# Patient Record
Sex: MALE | Race: OTHER | URBAN - METROPOLITAN AREA
[De-identification: names, ages, dates, MRNs, and addresses within clinical notes are randomized per-mention and may not be internally consistent; named-entity substitution may affect disease eponyms.]

---

## 2022-07-07 NOTE — PATIENT DISCUSSION
NOT VISUALLY SIGNIFICANT: Informed patient that their cataract is not visually significant or does not meet the criteria for cataract surgery. Recommend attention to cataract symptoms monitoring by regular examinations. Patient instructed to call with changes in vision.  Mrx given.

## 2023-04-18 ENCOUNTER — NEW PATIENT (OUTPATIENT)
Dept: URBAN - METROPOLITAN AREA CLINIC 52 | Facility: CLINIC | Age: 53
End: 2023-04-18

## 2023-04-18 DIAGNOSIS — Z01.01: ICD-10-CM

## 2023-04-18 PROCEDURE — 92015 DETERMINE REFRACTIVE STATE: CPT

## 2023-04-18 PROCEDURE — 92004 COMPRE OPH EXAM NEW PT 1/>: CPT

## 2023-04-18 ASSESSMENT — KERATOMETRY
OS_K2POWER_DIOPTERS: 42.00
OD_AXISANGLE2_DEGREES: 102
OD_K1POWER_DIOPTERS: 41.75
OD_AXISANGLE_DEGREES: 012
OS_AXISANGLE_DEGREES: 165
OS_K1POWER_DIOPTERS: 41.50
OS_AXISANGLE2_DEGREES: 75
OD_K2POWER_DIOPTERS: 42.00

## 2023-04-18 ASSESSMENT — VISUAL ACUITY
OD_CC: 20/20
OU_SC: 20/400
OS_SC: 20/400
OU_CC: 20/20
OU_CC: J1
OD_SC: 20/400
OS_CC: 20/20

## 2023-04-18 ASSESSMENT — TONOMETRY
OD_IOP_MMHG: 14
OS_IOP_MMHG: 13

## 2025-01-16 ENCOUNTER — COMPREHENSIVE EXAM (OUTPATIENT)
Age: 55
End: 2025-01-16

## 2025-01-16 DIAGNOSIS — H52.4: ICD-10-CM

## 2025-01-16 DIAGNOSIS — H25.13: ICD-10-CM

## 2025-01-16 DIAGNOSIS — Z01.01: ICD-10-CM

## 2025-01-16 PROCEDURE — 92014 COMPRE OPH EXAM EST PT 1/>: CPT

## 2025-01-16 PROCEDURE — 92015 DETERMINE REFRACTIVE STATE: CPT
